# Patient Record
(demographics unavailable — no encounter records)

---

## 2025-03-07 NOTE — ASSESSMENT
[FreeTextEntry1] : Ms. MASON ANG is a 32 year old woman with no significant PMHx who is presenting for evaluation of chest pain.  -Order TTE to rule out structural changes -Order exercise stress ECG test in this low risk patient  Time in encounter, including face to face visit and time reviewing chart: 40  minutes  López Becerra MD, FACC Non-Invasive Cardiology [address], [address] Office: [phone]

## 2025-03-07 NOTE — ASSESSMENT
[FreeTextEntry1] : Ms. MASON ANG is a 32 year old woman with no significant PMHx who is presenting for evaluation of chest pain.  -Order TTE to rule out structural changes -Order exercise stress ECG test in this low risk patient  Time in encounter, including face to face visit and time reviewing chart: 40  minutes  López Becerra MD, FACC Non-Invasive Cardiology [address], [address] Office: [phone]

## 2025-03-07 NOTE — REASON FOR VISIT
[Symptom and Test Evaluation] : symptom and test evaluation [FreeTextEntry1] : Ms. MASON ANG is a 32 year old woman with no significant PMHx who is presenting for evaluation of chest pain. She has been having left sided chest pain off and on. The pain is worse with palpation, and is not associated with exertion. She has no dyspnea or SOB.   She was previously seen by Dr Caba for abnormal ECG (possible RVH)  She has FHx of HTN (mother).  She does not smoke.